# Patient Record
Sex: MALE | Race: WHITE | NOT HISPANIC OR LATINO | URBAN - NONMETROPOLITAN AREA
[De-identification: names, ages, dates, MRNs, and addresses within clinical notes are randomized per-mention and may not be internally consistent; named-entity substitution may affect disease eponyms.]

---

## 2019-10-23 NOTE — PATIENT DISCUSSION
Discussed the options of vitrectomy surgery with membrane peeling vs. observation alone. Risks, benefits and alternatives were discussed in detail. All questions were answered. Pt elects to proceed with surgery.

## 2020-05-27 ENCOUNTER — IMPORTED ENCOUNTER (OUTPATIENT)
Dept: URBAN - NONMETROPOLITAN AREA CLINIC 1 | Facility: CLINIC | Age: 65
End: 2020-05-27

## 2020-05-27 PROBLEM — H52.4: Noted: 2021-10-13

## 2020-05-27 PROBLEM — H25.13: Noted: 2020-05-27

## 2020-05-27 PROBLEM — H35.3131: Noted: 2021-10-13

## 2020-05-27 PROBLEM — H16.223: Noted: 2020-05-27

## 2020-05-27 PROCEDURE — 99204 OFFICE O/P NEW MOD 45 MIN: CPT

## 2020-05-27 NOTE — PATIENT DISCUSSION
Dry Eye Syndrome OU- Continue current management.- Recommended artificial tears to use as directed. - Discussed lid hygiene warm compress and eyelid wash. Cataract OU-Not yet surgical. -Reviewed symptoms of advancing cataract growth such as glare and halos and decreased vision.-Continue to monitor for now. Pt will notify us if any new symptoms develop.

## 2021-10-13 ENCOUNTER — IMPORTED ENCOUNTER (OUTPATIENT)
Dept: URBAN - NONMETROPOLITAN AREA CLINIC 1 | Facility: CLINIC | Age: 66
End: 2021-10-13

## 2021-10-13 PROBLEM — H35.3131: Noted: 2021-10-13

## 2021-10-13 PROBLEM — H25.13: Noted: 2020-05-27

## 2021-10-13 PROBLEM — H52.4: Noted: 2021-10-13

## 2021-10-13 PROCEDURE — 92014 COMPRE OPH EXAM EST PT 1/>: CPT

## 2021-10-13 PROCEDURE — 92015 DETERMINE REFRACTIVE STATE: CPT

## 2021-10-13 NOTE — PATIENT DISCUSSION
Presbyopia OUDiscussed refractive status in detail with patient. New glasses Rx given today but are optional patient is naturally mono vision. Continue to monitor. [place] OUDiscussed diagnosis with patient. Reviewed symptoms related to cataract progression. No treatment required at this time. Continue to monitor. ARMD OUDiscussed diagnosis with patient. Positive family history of disease (mother)Discussed the chronic nature of this disease and limited treatment options. Recommend taking eye vitamins daily and monitoring vision for changes with [place] [employer] sample of Preservision given today. Continue to monitor. RTC in 6 months with OCT

## 2022-04-10 ASSESSMENT — TONOMETRY
OS_IOP_MMHG: 14
OS_IOP_MMHG: 15
OD_IOP_MMHG: 14
OD_IOP_MMHG: 15

## 2022-04-10 ASSESSMENT — VISUAL ACUITY
OD_SC: 20/20
OD_PH: 20/50
OD_CC: 20/150
OS_CC: 20/20

## 2022-04-13 ENCOUNTER — FOLLOW UP (OUTPATIENT)
Dept: RURAL CLINIC 3 | Facility: CLINIC | Age: 67
End: 2022-04-13

## 2022-04-13 DIAGNOSIS — H35.3131: ICD-10-CM

## 2022-04-13 PROCEDURE — 92134 CPTRZ OPH DX IMG PST SGM RTA: CPT

## 2022-04-13 PROCEDURE — 99214 OFFICE O/P EST MOD 30 MIN: CPT

## 2022-04-13 ASSESSMENT — TONOMETRY
OS_IOP_MMHG: 15
OD_IOP_MMHG: 15

## 2022-04-13 ASSESSMENT — VISUAL ACUITY
OD_SC: 20/90-1
OS_SC: 20/25
OD_PH: 20/50

## 2022-04-13 NOTE — PATIENT DISCUSSION
(Observe) Observe for now without intervention. The patient was advised to contact office with any change or worsening of vision. Verbal consent for TPA given by PT with Desiree Bryson RN witness     Jackson Flaherty RN  09/10/21 [id number]

## 2022-10-18 ENCOUNTER — COMPREHENSIVE EXAM (OUTPATIENT)
Dept: RURAL CLINIC 3 | Facility: CLINIC | Age: 67
End: 2022-10-18

## 2022-10-18 DIAGNOSIS — H52.4: ICD-10-CM

## 2022-10-18 PROCEDURE — 92015 DETERMINE REFRACTIVE STATE: CPT

## 2022-10-18 PROCEDURE — 92014 COMPRE OPH EXAM EST PT 1/>: CPT

## 2022-10-18 ASSESSMENT — VISUAL ACUITY
OS_SC: 20/20-1
OD_SC: 20/70-1
OD_PH: 20/40

## 2022-10-18 ASSESSMENT — TONOMETRY
OS_IOP_MMHG: 16
OD_IOP_MMHG: 16

## 2023-04-19 ENCOUNTER — FOLLOW UP (OUTPATIENT)
Dept: RURAL CLINIC 3 | Facility: CLINIC | Age: 68
End: 2023-04-19

## 2023-04-19 DIAGNOSIS — H35.3131: ICD-10-CM

## 2023-04-19 PROCEDURE — 92134 CPTRZ OPH DX IMG PST SGM RTA: CPT

## 2023-04-19 PROCEDURE — 99214 OFFICE O/P EST MOD 30 MIN: CPT

## 2023-04-19 ASSESSMENT — TONOMETRY
OD_IOP_MMHG: 16
OS_IOP_MMHG: 15

## 2023-04-19 ASSESSMENT — VISUAL ACUITY
OD_PH: 20/50-2
OS_SC: 20/20
OD_SC: 20/200

## 2023-10-25 ENCOUNTER — FOLLOW UP (OUTPATIENT)
Dept: RURAL CLINIC 3 | Facility: CLINIC | Age: 68
End: 2023-10-25

## 2023-10-25 DIAGNOSIS — H52.4: ICD-10-CM

## 2023-10-25 DIAGNOSIS — H35.3131: ICD-10-CM

## 2023-10-25 PROCEDURE — 92250 FUNDUS PHOTOGRAPHY W/I&R: CPT

## 2023-10-25 PROCEDURE — 92015 DETERMINE REFRACTIVE STATE: CPT

## 2023-10-25 PROCEDURE — 99214 OFFICE O/P EST MOD 30 MIN: CPT

## 2023-10-25 ASSESSMENT — VISUAL ACUITY
OD_SC: 20/400
OD_PH: 20/60-2
OS_SC: 20/25

## 2023-10-25 ASSESSMENT — TONOMETRY
OD_IOP_MMHG: 13
OS_IOP_MMHG: 13

## 2024-06-06 ENCOUNTER — FOLLOW UP (OUTPATIENT)
Dept: RURAL CLINIC 3 | Facility: CLINIC | Age: 69
End: 2024-06-06

## 2024-06-06 DIAGNOSIS — H25.13: ICD-10-CM

## 2024-06-06 DIAGNOSIS — H35.3131: ICD-10-CM

## 2024-06-06 PROCEDURE — 99214 OFFICE O/P EST MOD 30 MIN: CPT

## 2024-06-06 PROCEDURE — 92134 CPTRZ OPH DX IMG PST SGM RTA: CPT

## 2024-06-06 ASSESSMENT — TONOMETRY
OD_IOP_MMHG: 16
OS_IOP_MMHG: 16

## 2024-06-06 ASSESSMENT — VISUAL ACUITY
OS_SC: 20/30
OD_PH: 20/40
OD_SC: 20/400

## 2024-08-12 ENCOUNTER — CONSULTATION/EVALUATION (OUTPATIENT)
Dept: RURAL CLINIC 3 | Facility: CLINIC | Age: 69
End: 2024-08-12

## 2024-08-12 DIAGNOSIS — H25.13: ICD-10-CM

## 2024-08-12 DIAGNOSIS — H35.3131: ICD-10-CM

## 2024-08-12 PROCEDURE — 92136 OPHTHALMIC BIOMETRY: CPT

## 2024-08-12 PROCEDURE — 92025 CPTRIZED CORNEAL TOPOGRAPHY: CPT | Mod: NC

## 2024-08-12 PROCEDURE — 99214 OFFICE O/P EST MOD 30 MIN: CPT

## 2024-08-12 PROCEDURE — 92134 CPTRZ OPH DX IMG PST SGM RTA: CPT

## 2024-08-12 ASSESSMENT — VISUAL ACUITY
OS_CC: 20/25
OD_PAM: 20/30
OS_SC: 20/30
OS_AM: 20/20
OD_PH: 20/40
OD_SC: 20/400
OD_CC: 20/100

## 2024-08-12 ASSESSMENT — TONOMETRY
OD_IOP_MMHG: 18
OS_IOP_MMHG: 18

## 2024-12-18 ENCOUNTER — FOLLOW UP (OUTPATIENT)
Age: 69
End: 2024-12-18

## 2024-12-18 DIAGNOSIS — H35.3131: ICD-10-CM

## 2024-12-18 DIAGNOSIS — H25.13: ICD-10-CM

## 2024-12-18 PROCEDURE — 92134 CPTRZ OPH DX IMG PST SGM RTA: CPT

## 2024-12-18 PROCEDURE — 99214 OFFICE O/P EST MOD 30 MIN: CPT

## 2025-06-25 ENCOUNTER — FOLLOW UP (OUTPATIENT)
Age: 70
End: 2025-06-25

## 2025-06-25 DIAGNOSIS — H35.3131: ICD-10-CM

## 2025-06-25 DIAGNOSIS — H25.13: ICD-10-CM

## 2025-06-25 PROCEDURE — 99214 OFFICE O/P EST MOD 30 MIN: CPT

## 2025-06-25 PROCEDURE — 92250 FUNDUS PHOTOGRAPHY W/I&R: CPT
